# Patient Record
Sex: FEMALE | Race: WHITE | NOT HISPANIC OR LATINO
[De-identification: names, ages, dates, MRNs, and addresses within clinical notes are randomized per-mention and may not be internally consistent; named-entity substitution may affect disease eponyms.]

---

## 2019-03-21 ENCOUNTER — APPOINTMENT (OUTPATIENT)
Dept: HEART AND VASCULAR | Facility: CLINIC | Age: 69
End: 2019-03-21
Payer: MEDICARE

## 2019-03-21 ENCOUNTER — NON-APPOINTMENT (OUTPATIENT)
Age: 69
End: 2019-03-21

## 2019-03-21 ENCOUNTER — LABORATORY RESULT (OUTPATIENT)
Age: 69
End: 2019-03-21

## 2019-03-21 VITALS
BODY MASS INDEX: 20.57 KG/M2 | HEART RATE: 72 BPM | WEIGHT: 128 LBS | DIASTOLIC BLOOD PRESSURE: 82 MMHG | SYSTOLIC BLOOD PRESSURE: 118 MMHG | HEIGHT: 66 IN

## 2019-03-21 DIAGNOSIS — Z86.69 PERSONAL HISTORY OF OTHER DISEASES OF THE NERVOUS SYSTEM AND SENSE ORGANS: ICD-10-CM

## 2019-03-21 DIAGNOSIS — M81.0 AGE-RELATED OSTEOPOROSIS W/OUT CURRENT PATHOLOGICAL FRACTURE: ICD-10-CM

## 2019-03-21 DIAGNOSIS — D47.2 MONOCLONAL GAMMOPATHY: ICD-10-CM

## 2019-03-21 DIAGNOSIS — Z87.891 PERSONAL HISTORY OF NICOTINE DEPENDENCE: ICD-10-CM

## 2019-03-21 DIAGNOSIS — Z87.898 PERSONAL HISTORY OF OTHER SPECIFIED CONDITIONS: ICD-10-CM

## 2019-03-21 DIAGNOSIS — Z82.3 FAMILY HISTORY OF STROKE: ICD-10-CM

## 2019-03-21 PROCEDURE — 36415 COLL VENOUS BLD VENIPUNCTURE: CPT

## 2019-03-21 PROCEDURE — 99215 OFFICE O/P EST HI 40 MIN: CPT

## 2019-03-21 PROCEDURE — 93000 ELECTROCARDIOGRAM COMPLETE: CPT

## 2019-03-21 RX ORDER — MINOXIDIL 2 %
5 SOLUTION, NON-ORAL TOPICAL
Refills: 0 | Status: ACTIVE | COMMUNITY
Start: 2019-03-21

## 2019-03-21 RX ORDER — TRETINOIN 0.25 MG/G
0.03 CREAM TOPICAL
Refills: 0 | Status: ACTIVE | COMMUNITY
Start: 2019-03-21

## 2019-03-21 NOTE — PHYSICAL EXAM
[General Appearance - Well Developed] : well developed [Normal Appearance] : normal appearance [Well Groomed] : well groomed [General Appearance - Well Nourished] : well nourished [No Deformities] : no deformities [General Appearance - In No Acute Distress] : no acute distress [Normal Conjunctiva] : the conjunctiva exhibited no abnormalities [Eyelids - No Xanthelasma] : the eyelids demonstrated no xanthelasmas [Normal Oral Mucosa] : normal oral mucosa [No Oral Pallor] : no oral pallor [No Oral Cyanosis] : no oral cyanosis [Normal Jugular Venous A Waves Present] : normal jugular venous A waves present [Normal Jugular Venous V Waves Present] : normal jugular venous V waves present [No Jugular Venous Yip A Waves] : no jugular venous yip A waves [Heart Rate And Rhythm] : heart rate and rhythm were normal [Heart Sounds] : normal S1 and S2 [Murmurs] : no murmurs present [Respiration, Rhythm And Depth] : normal respiratory rhythm and effort [Exaggerated Use Of Accessory Muscles For Inspiration] : no accessory muscle use [Auscultation Breath Sounds / Voice Sounds] : lungs were clear to auscultation bilaterally [Abdomen Soft] : soft [Abdomen Tenderness] : non-tender [Abdomen Mass (___ Cm)] : no abdominal mass palpated [Abnormal Walk] : normal gait [Gait - Sufficient For Exercise Testing] : the gait was sufficient for exercise testing [Nail Clubbing] : no clubbing of the fingernails [Cyanosis, Localized] : no localized cyanosis [Petechial Hemorrhages (___cm)] : no petechial hemorrhages [] : no ischemic changes [Skin Color & Pigmentation] : normal skin color and pigmentation [Skin Turgor] : normal skin turgor [No Venous Stasis] : no venous stasis [Skin Lesions] : no skin lesions [No Skin Ulcers] : no skin ulcer [No Xanthoma] : no  xanthoma was observed [Oriented To Time, Place, And Person] : oriented to person, place, and time [Affect] : the affect was normal [Mood] : the mood was normal [No Anxiety] : not feeling anxious [FreeTextEntry1] : macular/papular diffuse rash noted on inner surface of b/l forearms and b/l LE

## 2019-03-21 NOTE — REASON FOR VISIT
[FreeTextEntry1] : 69 year old female w/ a PMHx of palpitations, MGUS (IgM), cataracts, osteoporosis.\par \par \par

## 2019-03-21 NOTE — DISCUSSION/SUMMARY
[FreeTextEntry1] : - Will check lipid profile, B12, Vitamin D levels\par - Her mild erythema and blanching (not clearly a rash) is unlikely to be related to a cardiovascular cause and suggest she investigate a possible culprit from her lifestyle - detergents, lotions, food, etc. \par -She will show to Dr. Pemberton as well when she sees him next week. \par -Calcium score to reassess for plaque in guiding decision for statin.

## 2019-03-21 NOTE — ASSESSMENT
[FreeTextEntry1] : 69 year old female w/ a PMHx of palpitations, MGUS (IgM), cataracts, osteoporosis.

## 2019-03-21 NOTE — HISTORY OF PRESENT ILLNESS
[FreeTextEntry1] : 69 year old female w/ a PMHx of palpitations, MGUS (IgM), cataracts, osteoporosis. She denies any cardiac symptoms. She c/o rash for the past year - red spots on inside of arms and lower legs. She denies any discomfort associated with it - no pain, itching, etc. She visited a dermatologist who was unable to diagnosis it and told her she should see a cardiologist to r/o vascular cause. \par \par Lifestyle History:\par Mediterranean Diet Score (9 question survey) was 8. She has been a vegan for the past 2 years - occasionally will have fish \par (8-9: optimal, 6-7: near-optimal, 4-5: suboptimal, 0-3: markedly suboptimal)\par Exercise: Patient reports exercising at a moderate level for >150 minutes per week. Reports going to the gym at least 5 days per week. \par Smoking: Former smoker (1/2-1 PPD x 5 years; quit 48 years ago).\par Stress: Patient denies any stress. \par \par She denies any chest pain, palpitations, SOB, LE edema, orthopnea, or PND.

## 2019-03-22 LAB
25(OH)D3 SERPL-MCNC: 28 NG/ML
VIT B12 SERPL-MCNC: >2000 PG/ML

## 2019-04-03 ENCOUNTER — FORM ENCOUNTER (OUTPATIENT)
Age: 69
End: 2019-04-03

## 2019-04-04 ENCOUNTER — OUTPATIENT (OUTPATIENT)
Dept: OUTPATIENT SERVICES | Facility: HOSPITAL | Age: 69
LOS: 1 days | End: 2019-04-04

## 2019-04-04 ENCOUNTER — APPOINTMENT (OUTPATIENT)
Dept: CT IMAGING | Facility: CLINIC | Age: 69
End: 2019-04-04

## 2019-05-15 ENCOUNTER — RECORD ABSTRACTING (OUTPATIENT)
Age: 69
End: 2019-05-15

## 2020-01-02 ENCOUNTER — RX RENEWAL (OUTPATIENT)
Age: 70
End: 2020-01-02

## 2020-06-04 ENCOUNTER — APPOINTMENT (OUTPATIENT)
Dept: HEART AND VASCULAR | Facility: CLINIC | Age: 70
End: 2020-06-04
Payer: MEDICARE

## 2020-06-04 PROCEDURE — 99214 OFFICE O/P EST MOD 30 MIN: CPT | Mod: 95

## 2020-06-04 NOTE — DISCUSSION/SUMMARY
[___ Month(s)] : [unfilled] month(s) [FreeTextEntry1] : Patient is a 71yo F w/ a PMHx of HLD, palpitations, MGUS (IgM), cataracts, and osteoporosis who calls for follow up. \par \par She will try crestor 5 mg daily and will repeat lipids in 2-3 months. She will notify us of any symptoms in the meantime. \par She will continue to work on her lifestyle with all the stressors and attempt to eat healthy. Encouraged to get some of that resistance type of exercise. \par \par

## 2020-06-04 NOTE — REASON FOR VISIT
Puneet Root [FreeTextEntry1] : Patient is a 69yo F w/ a PMHx of HLD, palpitations, MGUS (IgM), cataracts, and osteoporosis who calls for follow up.

## 2020-06-04 NOTE — HISTORY OF PRESENT ILLNESS
[Home] : at home, [unfilled] , at the time of the visit. [Verbal consent obtained from patient] : the patient, [unfilled] [Medical Office: ([facility])___] : at the medical office located in  [FreeTextEntry1] : This visit was conducted using a telehealth platform in the setting of COVID-19 Pandemic.\par Patient initiated current encounter. Patient has provided verbal authorization to participate in this visit. \par Reason for telehealth visit: follow-up of CVD\par I have spent 25 minutes with the patient discussing.\par Documentation- 5 minutes\par Physical exam was not done, however patient provided blood pressures and weight.\par \par \par Patient is a 71yo F w/ a PMHx of HLD, palpitations, MGUS (IgM), cataracts, and osteoporosis who calls for follow up. \par \par Had CCS in 2013 and 2019 and both were zero.\par \par She is walking extensively nowadays, but not doing the prior resistance type exercises that she used to do at the gym. \par She denies any symptoms at this time.\par She is otherwise currently eating a lot of eggs and salmon for her hair loss and has a hard time spending a lot of time on meals because of little time and a lot of stress at home.\par Her daughter is getting a divorce and has a hyperactive 11 month old now living with them.  \par Her main concern with statins is the possibility of developing Parkinsons and she would like a hydrophylic statin for that reason based on some literature suggesting less likelihood to develop. \par

## 2021-03-05 ENCOUNTER — APPOINTMENT (OUTPATIENT)
Dept: HEART AND VASCULAR | Facility: CLINIC | Age: 71
End: 2021-03-05
Payer: MEDICARE

## 2021-03-05 PROCEDURE — 99213 OFFICE O/P EST LOW 20 MIN: CPT | Mod: 95

## 2021-03-05 RX ORDER — ATORVASTATIN CALCIUM 10 MG/1
10 TABLET, FILM COATED ORAL
Qty: 90 | Refills: 3 | Status: DISCONTINUED | COMMUNITY
Start: 2019-12-30 | End: 2021-03-05

## 2021-03-08 NOTE — DISCUSSION/SUMMARY
[FreeTextEntry1] : Patient is a 72 yo F w/ a PMHx of HLD, palpitations, MGUS (IgM), cataracts, and osteoporosis who calls for follow up. \par \par She will have her lipids repeated in a month and would consider increasing her crestor at this time as she understands the importance of getting her LDL as low as possible. \par She will also monitor her BP's but normal to date. \par Encouraged to get exercise and healthy diet as feasible. \par She will return for follow-up in one month when she has had her cast removed.

## 2021-03-08 NOTE — HISTORY OF PRESENT ILLNESS
[Home] : at home, [unfilled] , at the time of the visit. [Medical Office: ([facility])___] : at the medical office located in  [Verbal consent obtained from patient] : the patient, [unfilled] [FreeTextEntry1] : This visit was conducted using a telehealth platform in the setting of COVID-19 Pandemic.\par Patient initiated current encounter. Patient has provided verbal authorization to participate in this visit. \par Reason for telehealth visit: follow-up of CVD\par I have spent 20 minutes with the patient discussing.\par Documentation- 5 minutes\par Physical exam was not done, however patient provided blood pressures and weight.\par \par Patient is a 70 yo F w/ a PMHx of HLD, palpitations, MGUS (IgM), cataracts, and osteoporosis who calls for follow up. \par \par Had CCS in 2013 and 2019 and both were zero. \par \par She had a recent fall a month ago where she slipped, but then hit her head and passed out with that episode. She reports being unconscious for some time with that episode even though she is not sure. She reports that in the work up at the hospital, the EKG noted atrial enlargement and her brain MRI noted vascular changes both of which concerned her. \par \par She had only been on statin in the past several years at a low dose but is now wondering if she needs a higher dose. \par \par She reports that she has a cast for a fracture and is hoping to return when she no longer has the fracture and is able to drive. \par \par

## 2021-04-12 ENCOUNTER — APPOINTMENT (OUTPATIENT)
Dept: HEART AND VASCULAR | Facility: CLINIC | Age: 71
End: 2021-04-12

## 2021-06-06 ENCOUNTER — RESULT CHARGE (OUTPATIENT)
Age: 71
End: 2021-06-06

## 2021-06-07 ENCOUNTER — APPOINTMENT (OUTPATIENT)
Dept: HEART AND VASCULAR | Facility: CLINIC | Age: 71
End: 2021-06-07
Payer: MEDICARE

## 2021-06-07 ENCOUNTER — NON-APPOINTMENT (OUTPATIENT)
Age: 71
End: 2021-06-07

## 2021-06-07 VITALS
HEART RATE: 80 BPM | DIASTOLIC BLOOD PRESSURE: 78 MMHG | BODY MASS INDEX: 20.73 KG/M2 | WEIGHT: 129 LBS | SYSTOLIC BLOOD PRESSURE: 112 MMHG | HEIGHT: 66 IN | TEMPERATURE: 98 F

## 2021-06-07 PROCEDURE — 93000 ELECTROCARDIOGRAM COMPLETE: CPT

## 2021-06-07 PROCEDURE — 93306 TTE W/DOPPLER COMPLETE: CPT

## 2021-06-07 PROCEDURE — 99214 OFFICE O/P EST MOD 30 MIN: CPT

## 2021-06-07 NOTE — REVIEW OF SYSTEMS
[Fever] : no fever [Headache] : no headache [Chills] : no chills [Feeling Fatigued] : not feeling fatigued [Blurry Vision] : no blurred vision [Seeing Double (Diplopia)] : no diplopia [Earache] : no earache [Discharge From Ears] : no discharge from the ears [Hearing Loss] : no hearing loss [Sore Throat] : no sore throat [Sinus Pressure] : no sinus pressure [Tinnitus] : no tinnitus [SOB] : no shortness of breath [Dyspnea on exertion] : not dyspnea during exertion [Chest Discomfort] : no chest discomfort [Lower Ext Edema] : no extremity edema [Leg Claudication] : no intermittent leg claudication [Palpitations] : no palpitations [Orthopnea] : no orthopnea [PND] : no PND [Syncope] : no syncope [Cough] : no cough [Wheezing] : no wheezing [Abdominal Pain] : no abdominal pain [Nausea] : no nausea [Vomiting] : no vomiting [Change in Appetite] : no change in appetite [Change In The Stool] : no change in stool [Dysphagia] : no dysphagia [Constipation] : no constipation [Blood in Stool] : no blood in stool [Dysuria] : no dysuria [Pelvic Pain] : no pelvic pain [Joint Pain] : no joint pain [Joint Swelling] : no joint swelling [Myalgia] : no myalgia [Rash] : no rash [Itching] : no itching [Skin Lesions] : no skin lesions [Telangiectasias] : no telangiectasias [Dizziness] : no dizziness [Tremor] : no tremor was seen [Convulsions] : no convulsions [Tingling (Paresthesia)] : no tingling [Limb Weakness (Paresis)] : no limb weakness (Paresis) [Confusion] : no confusion was observed [Memory Lapses Or Loss] : no memory lapses or loss [Under Stress] : not under stress [Suicidal] : not suicidal [Easy Bleeding] : no tendency for easy bleeding

## 2021-06-07 NOTE — PHYSICAL EXAM
[Well Developed] : well developed [Well Nourished] : well nourished [No Acute Distress] : no acute distress [Normal Conjunctiva] : normal conjunctiva [Normal Venous Pressure] : normal venous pressure [No Carotid Bruit] : no carotid bruit [Normal S1, S2] : normal S1, S2 [No Murmur] : no murmur [No Rub] : no rub [Clear Lung Fields] : clear lung fields [Good Air Entry] : good air entry [No Respiratory Distress] : no respiratory distress  [Soft] : abdomen soft [Non Tender] : non-tender [No Masses/organomegaly] : no masses/organomegaly [Normal Gait] : normal gait [No Edema] : no edema [No Cyanosis] : no cyanosis [No Clubbing] : no clubbing [No Rash] : no rash [No Skin Lesions] : no skin lesions [Moves all extremities] : moves all extremities [No Focal Deficits] : no focal deficits [Normal Speech] : normal speech [Alert and Oriented] : alert and oriented [Normal memory] : normal memory

## 2021-06-07 NOTE — HISTORY OF PRESENT ILLNESS
[FreeTextEntry1] : Patient is a 70 yo F w/ a PMHx of HLD, palpitations, MGUS (IgM), cataracts, and osteoporosis who calls for follow up. \par \par Had CCS in 2013 and 2019 and both were zero. \par \par She had a fall in the winter when slipping on black ice. She definitely does not feel like she had syncope precipitating the fall. She reports that in the work up at the hospital, the EKG noted atrial enlargement and her brain MRI noted vascular changes both of which concerned her. \par \par She has been feeling well overall and was walking up to 6 miles at a time. She reports that she is more concerned about her 's health and his mental status lately. \par \par She wishes to repeat her lipids and will do so when fasting. \par \par She is eating a mostly plant-based diet and maintaining a healthy weight. \par \par

## 2021-06-07 NOTE — ASSESSMENT
[FreeTextEntry1] : Patient is a 70 yo F w/ a PMHx of HLD, palpitations, MGUS (IgM), cataracts, and osteoporosis who calls for follow up. \par \par Will repeat labs outside of the office when she is fasting. \par \par Echo done today with no significant pathology. \par \par She will continue to work on maintaining a healthy lifestyle as she is and will send her lipid profile for our review. \par \par I feel confident that her episode in the winter was a mechanical fall and does not any further work-up. \par \par Return in 6 months or visit via telehealth. \par \par ~30 minutes spend counseling patient about her own health and managing that of her .

## 2021-08-20 ENCOUNTER — NON-APPOINTMENT (OUTPATIENT)
Age: 71
End: 2021-08-20

## 2021-09-29 ENCOUNTER — TRANSCRIPTION ENCOUNTER (OUTPATIENT)
Age: 71
End: 2021-09-29

## 2021-09-30 ENCOUNTER — TRANSCRIPTION ENCOUNTER (OUTPATIENT)
Age: 71
End: 2021-09-30

## 2021-10-01 ENCOUNTER — TRANSCRIPTION ENCOUNTER (OUTPATIENT)
Age: 71
End: 2021-10-01

## 2021-10-04 ENCOUNTER — TRANSCRIPTION ENCOUNTER (OUTPATIENT)
Age: 71
End: 2021-10-04

## 2021-10-05 ENCOUNTER — TRANSCRIPTION ENCOUNTER (OUTPATIENT)
Age: 71
End: 2021-10-05

## 2021-11-02 ENCOUNTER — APPOINTMENT (OUTPATIENT)
Dept: HEART AND VASCULAR | Facility: CLINIC | Age: 71
End: 2021-11-02
Payer: MEDICARE

## 2021-11-02 PROCEDURE — 99214 OFFICE O/P EST MOD 30 MIN: CPT | Mod: 95

## 2021-11-03 ENCOUNTER — NON-APPOINTMENT (OUTPATIENT)
Age: 71
End: 2021-11-03

## 2021-11-03 ENCOUNTER — APPOINTMENT (OUTPATIENT)
Dept: PULMONOLOGY | Facility: CLINIC | Age: 71
End: 2021-11-03
Payer: MEDICARE

## 2021-11-03 VITALS
HEIGHT: 66 IN | DIASTOLIC BLOOD PRESSURE: 80 MMHG | TEMPERATURE: 97.8 F | OXYGEN SATURATION: 98 % | HEART RATE: 80 BPM | WEIGHT: 129 LBS | BODY MASS INDEX: 20.73 KG/M2 | SYSTOLIC BLOOD PRESSURE: 120 MMHG

## 2021-11-03 PROCEDURE — 99204 OFFICE O/P NEW MOD 45 MIN: CPT | Mod: GC

## 2021-11-03 NOTE — ASSESSMENT
[FreeTextEntry1] : Data reviewed:\par \par Cardiac CT [facility] 2019 personally reviewed : multiple tiny nodules up to 4mm\par \par Impression:\par Lung Nodules\par \par Plan:\par Multiple small nodules <6mm with low risk for lung cancer. Per Fleischner Society guidelines there is no need for further monitoring or repeat CT scans. Mrs. Mcclellan will follow up as needed. \par --\par Attending Addendum\par \par Seen and examined by me and agree w above. Per Fleischner Society guidelines no further follow up is needed in this minimal smoker w tiny nodules. Pt reassured.\par

## 2021-11-03 NOTE — HISTORY OF PRESENT ILLNESS
[TextBox_4] : 11/3/21 [Octaviaer]: Mrs. 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 Guardado for indecently found pulmonary nodules on a Cardiac CT scan from 2019. She has a 5 pk/yr smoking history, quit 50 years ago. No family history of lung ca in first degree relative. Denies any significant cough, SOB, CANDELARIA, chest pain or phlegm.

## 2021-11-04 NOTE — DISCUSSION/SUMMARY
[FreeTextEntry1] : Increase the crestor to 10 mg daily for at least two months and recheck lipids. Will work to get her to goal slowly based on her tolerance.\par \par Advised her to follow-up with rheumatology for symptoms. \par \par Encouraged her to continue her healthy diet and attempt to get more activity.

## 2021-11-04 NOTE — HISTORY OF PRESENT ILLNESS
[FreeTextEntry1] : This visit was conducted using a telehealth platform in the setting of COVID-19 Pandemic.\par Patient initiated current encounter. Patient has provided verbal authorization to participate in this visit. \par Reason for telehealth visit: hyperlipidemia\par I have spent 30  minutes with the patient face-to-face discussing.\par Documentation- 5 minutes\par Physical exam was not done, however patient provided blood pressures and weight. \par \par Patient is a 70 yo F w/ a PMHx of HLD, palpitations, MGUS (IgM), cataracts, and osteoporosis who calls for follow up. \par \par Had CCS in 2013 and 2019 and both were zero. \par \par Livido Reticularis - seeing rheumatology for that and Raynaud's. They are suggesting a much more extensive work-up for rheumatologic disorders.  \par \par She had left sided breast surgery and that is where the nodules were noted on her CT coronary test. \par \par She is seeing a pulmonologist for evaluation of pulmonary nodules.  \par \par She continues to eat a mostly plant-based diet and maintaining a healthy weight. \par \par She also has Dupetreyne's contractures. \par \par 9/22/2021- total-226, , HDL 82, trig 61\par \par She had had a very bad rash on her chest/breast which has now resolved, but she does not have an etiology.

## 2022-01-10 ENCOUNTER — TRANSCRIPTION ENCOUNTER (OUTPATIENT)
Age: 72
End: 2022-01-10

## 2022-02-25 ENCOUNTER — TRANSCRIPTION ENCOUNTER (OUTPATIENT)
Age: 72
End: 2022-02-25

## 2022-02-28 ENCOUNTER — TRANSCRIPTION ENCOUNTER (OUTPATIENT)
Age: 72
End: 2022-02-28

## 2022-03-01 ENCOUNTER — TRANSCRIPTION ENCOUNTER (OUTPATIENT)
Age: 72
End: 2022-03-01

## 2022-03-07 ENCOUNTER — APPOINTMENT (OUTPATIENT)
Dept: HEART AND VASCULAR | Facility: CLINIC | Age: 72
End: 2022-03-07
Payer: MEDICARE

## 2022-03-07 PROCEDURE — 99214 OFFICE O/P EST MOD 30 MIN: CPT | Mod: 95

## 2022-03-08 NOTE — HISTORY OF PRESENT ILLNESS
[FreeTextEntry1] : Patient is a 73 yo F w/ a PMHx of HLD, palpitations, MGUS (IgM), cataracts, and osteoporosis who calls for follow up. \par \par She had a rheumatologic visit that involved many lab tests. She has not been taking the zetia and is taking crestor aloe. She is worried about the potential side effects of it. \par \par She has now gone from osteoporosis to osteopenia. \par \par Her blood pressures are mostly checked at the offices have been normal. She has not been checking her BP's at home. \par \par She is walking and doing pilates at home. \par \par 9/23/2022- trig- 62, HDL 82,chol 226, \par \par Had CCS in 2013 and 2019 and both were zero. \par \par She is eating a mostly plant-based diet and maintaining a healthy weight. \par \par

## 2022-03-08 NOTE — DISCUSSION/SUMMARY
[FreeTextEntry1] : Patient is a 71 yo F w/ a PMHx of HLD, palpitations, MGUS (IgM), cataracts, and osteoporosis who calls for follow up. \par \par Based on the fact that she has tolerated crestor without issues, she is willing to attempt the increased dose and see if it is of benefit. \par \par Recheck her lipids in 2-3 months. \par \par She will then return one week after that for in person visit which she will keep as at least once yearly wiith remaining to be done remotely. \par \par Encouraged her to continue her healthy lifestyle habits.

## 2022-03-09 ENCOUNTER — TRANSCRIPTION ENCOUNTER (OUTPATIENT)
Age: 72
End: 2022-03-09

## 2022-06-06 ENCOUNTER — APPOINTMENT (OUTPATIENT)
Dept: HEART AND VASCULAR | Facility: CLINIC | Age: 72
End: 2022-06-06
Payer: MEDICARE

## 2022-06-06 VITALS
OXYGEN SATURATION: 98 % | HEART RATE: 74 BPM | TEMPERATURE: 97.2 F | DIASTOLIC BLOOD PRESSURE: 80 MMHG | SYSTOLIC BLOOD PRESSURE: 125 MMHG | WEIGHT: 129 LBS | BODY MASS INDEX: 19.11 KG/M2 | HEIGHT: 69 IN

## 2022-06-06 PROCEDURE — 99214 OFFICE O/P EST MOD 30 MIN: CPT

## 2022-06-06 PROCEDURE — 93000 ELECTROCARDIOGRAM COMPLETE: CPT

## 2022-06-06 RX ORDER — ROSUVASTATIN CALCIUM 10 MG/1
10 TABLET, FILM COATED ORAL DAILY
Qty: 90 | Refills: 3 | Status: DISCONTINUED | COMMUNITY
Start: 2020-06-04 | End: 2022-06-06

## 2022-06-06 NOTE — HISTORY OF PRESENT ILLNESS
[FreeTextEntry1] : Patient is a 73 yo F w/ a PMHx of HLD, palpitations, MGUS (IgM), cataracts, and osteoporosis who calls for follow up. \par \par Since last visit: \par No new symptoms. No issues. \par She developed dizziness and feeling unwell after 1 dose of the statin 10 mg. We had discussed rechallenging or taking the medication at night however she was worried about side effects so she has not done that yet. \par We also talked about low LDL and their relation to brain function as well as statin and dementia. \par She is willing to alternate between 5 and 10 mg for now and see how she does \par \par Her blood pressures are mostly checked at the offices have been normal. She has not been checking her BP's at home. \par \par She is walking and doing pilates at home. \par \par 9/23/2022- trig- 62, HDL 82,chol 226, \par Lpa 74.9\par \par Had CCS in 2013 and 2019 and both were zero. \par \par She is eating a mostly plant-based diet and maintaining a healthy weight. \par

## 2022-06-06 NOTE — DISCUSSION/SUMMARY
[FreeTextEntry1] : Patient is a 71 yo F w/ a PMHx of HLD, palpitations, MGUS (IgM), cataracts, and osteoporosis who calls for follow up. \par \par # DLD - Uncontrolled \par - We agreed to alternate between 5 and 10 for now and if she tolerates we can stay on the 10 then repeat lipids \par \par Encouraged her to continue her healthy lifestyle habits.

## 2022-06-06 NOTE — PHYSICAL EXAM
[Well Developed] : well developed [Well Nourished] : well nourished [No Acute Distress] : no acute distress [Normal Venous Pressure] : normal venous pressure [No Carotid Bruit] : no carotid bruit [Normal S1, S2] : normal S1, S2 [No Murmur] : no murmur [Clear Lung Fields] : clear lung fields [Good Air Entry] : good air entry [Soft] : abdomen soft [Non Tender] : non-tender [No Masses/organomegaly] : no masses/organomegaly [Normal Gait] : normal gait [No Edema] : no edema [No Cyanosis] : no cyanosis [No Rash] : no rash [No Skin Lesions] : no skin lesions [Moves all extremities] : moves all extremities [No Focal Deficits] : no focal deficits [Normal Speech] : normal speech [Alert and Oriented] : alert and oriented [Normal memory] : normal memory

## 2022-09-21 ENCOUNTER — TRANSCRIPTION ENCOUNTER (OUTPATIENT)
Age: 72
End: 2022-09-21

## 2023-06-27 ENCOUNTER — NON-APPOINTMENT (OUTPATIENT)
Age: 73
End: 2023-06-27

## 2023-06-27 ENCOUNTER — APPOINTMENT (OUTPATIENT)
Dept: HEART AND VASCULAR | Facility: CLINIC | Age: 73
End: 2023-06-27
Payer: MEDICARE

## 2023-06-27 VITALS
BODY MASS INDEX: 19.11 KG/M2 | HEIGHT: 69 IN | SYSTOLIC BLOOD PRESSURE: 142 MMHG | HEART RATE: 81 BPM | DIASTOLIC BLOOD PRESSURE: 83 MMHG | TEMPERATURE: 97.6 F | WEIGHT: 129 LBS | OXYGEN SATURATION: 98 %

## 2023-06-27 VITALS — SYSTOLIC BLOOD PRESSURE: 115 MMHG | DIASTOLIC BLOOD PRESSURE: 77 MMHG

## 2023-06-27 PROCEDURE — 36415 COLL VENOUS BLD VENIPUNCTURE: CPT

## 2023-06-27 PROCEDURE — 99215 OFFICE O/P EST HI 40 MIN: CPT

## 2023-06-27 PROCEDURE — 93000 ELECTROCARDIOGRAM COMPLETE: CPT

## 2023-06-27 RX ORDER — EZETIMIBE 10 MG/1
10 TABLET ORAL DAILY
Qty: 90 | Refills: 3 | Status: COMPLETED | COMMUNITY
Start: 2021-09-28 | End: 2023-06-27

## 2023-06-27 NOTE — DISCUSSION/SUMMARY
[FreeTextEntry1] : Patient is a 72 yo F w/ a PMHx of HLD, palpitations, MGUS (IgM), cataracts, and osteoporosis who returns for follow up. \par \par # DLD - \par - crestor 10mg qd\par - follow up lipid panel obtained today to assess response on crestor 10mg. Will consider escalating as needed. \par \par Mildly elevated glucose-\par Repeat A1C for improvement. \par \par Encouraged her to continue her healthy lifestyle habits. \par \par Return in one year for follow-up and echo.  [EKG obtained to assist in diagnosis and management of assessed problem(s)] : EKG obtained to assist in diagnosis and management of assessed problem(s)

## 2023-06-27 NOTE — HISTORY OF PRESENT ILLNESS
[FreeTextEntry1] : Patient is a 72 yo F w/ a PMHx of HLD, palpitations, MGUS (IgM), cataracts, and osteoporosis who returns for follow up. \par \par She has been compliant with crestor 10mg qd, concerned that it is making her tired. She also reports that she doesn't sleep well and is planned for sleep study to further evaluate.\par \par She is walking and doing cardio pilates and pilates at home at least 5 days per week.\par \par 9/23/2022- trig- 62, HDL 82,chol 226, \par Lpa 74.9\par \par Had CCS in 2013 and 2019 and both were zero. \par \par She is eating a mostly plant-based diet and maintaining a healthy weight. \par

## 2023-06-28 LAB
CHOLEST SERPL-MCNC: 186 MG/DL
ESTIMATED AVERAGE GLUCOSE: 114 MG/DL
HBA1C MFR BLD HPLC: 5.6 %
HDLC SERPL-MCNC: 76 MG/DL
LDLC SERPL CALC-MCNC: 92 MG/DL
NONHDLC SERPL-MCNC: 110 MG/DL
TRIGL SERPL-MCNC: 92 MG/DL

## 2023-06-30 ENCOUNTER — TRANSCRIPTION ENCOUNTER (OUTPATIENT)
Age: 73
End: 2023-06-30

## 2023-12-14 ENCOUNTER — TRANSCRIPTION ENCOUNTER (OUTPATIENT)
Age: 73
End: 2023-12-14

## 2023-12-15 ENCOUNTER — TRANSCRIPTION ENCOUNTER (OUTPATIENT)
Age: 73
End: 2023-12-15

## 2023-12-15 DIAGNOSIS — R73.03 PREDIABETES.: ICD-10-CM

## 2024-01-24 ENCOUNTER — TRANSCRIPTION ENCOUNTER (OUTPATIENT)
Age: 74
End: 2024-01-24

## 2024-02-12 ENCOUNTER — APPOINTMENT (OUTPATIENT)
Dept: HEART AND VASCULAR | Facility: CLINIC | Age: 74
End: 2024-02-12
Payer: MEDICARE

## 2024-02-12 VITALS
BODY MASS INDEX: 21.49 KG/M2 | DIASTOLIC BLOOD PRESSURE: 84 MMHG | WEIGHT: 129 LBS | SYSTOLIC BLOOD PRESSURE: 133 MMHG | HEART RATE: 64 BPM | HEIGHT: 65 IN

## 2024-02-12 VITALS — SYSTOLIC BLOOD PRESSURE: 125 MMHG | DIASTOLIC BLOOD PRESSURE: 80 MMHG

## 2024-02-12 DIAGNOSIS — E78.5 HYPERLIPIDEMIA, UNSPECIFIED: ICD-10-CM

## 2024-02-12 DIAGNOSIS — Z00.00 ENCOUNTER FOR GENERAL ADULT MEDICAL EXAMINATION W/OUT ABNORMAL FINDINGS: ICD-10-CM

## 2024-02-12 PROCEDURE — G2211 COMPLEX E/M VISIT ADD ON: CPT

## 2024-02-12 PROCEDURE — 99214 OFFICE O/P EST MOD 30 MIN: CPT

## 2024-02-12 PROCEDURE — 93000 ELECTROCARDIOGRAM COMPLETE: CPT

## 2024-02-12 RX ORDER — RIBOFLAVIN (VITAMIN B2) 50 MG
TABLET ORAL
Refills: 0 | Status: DISCONTINUED | COMMUNITY
End: 2024-02-12

## 2024-02-12 NOTE — PHYSICAL EXAM
[Well Developed] : well developed [Well Nourished] : well nourished [No Acute Distress] : no acute distress [Normal Venous Pressure] : normal venous pressure [Normal S1, S2] : normal S1, S2 [No Murmur] : no murmur [Clear Lung Fields] : clear lung fields [Good Air Entry] : good air entry [Soft] : abdomen soft [Non Tender] : non-tender [Normal Gait] : normal gait [No Edema] : no edema [No Cyanosis] : no cyanosis [No Rash] : no rash [Moves all extremities] : moves all extremities [Alert and Oriented] : alert and oriented

## 2024-02-12 NOTE — HISTORY OF PRESENT ILLNESS
[FreeTextEntry1] : Patient is a 75 yo F w/ a PMHx of HLD, palpitations, MGUS (IgM), cataracts, and osteopenia (previously osteoporotic) who returns for follow up and echo.   She has been adherent to Crestor 10mg qd. She is walking, doing Cardio workouts and Pilates at home at least 5 days per week. Patient states having a healthy diet with plenty of vegetables daily and salmon. Patient does not have meat in diet. No chest pain, SOB, palpitations, nausea, vomiting, PND, or orthopnea reported.  Patient currently does not have a PCP at this time.   She is considering breast reduction surgery since the weight of her breasts is causing her quite a bit of neck and back discomfort.  ------------------------------------------------------------------------------------------------------------------------------------------------------------ Labs:  - 6/28/23: A1C 5.6%  - 6/28/2023: TAG 92, , HDL 76, LDL 92 - 9/23/2022- trig- 62, HDL 82, Chol 226,  - Lpa 74.9  Imaging and testing: - Echo (6/2021): nl mitral valve, no mitral regurgitation, normal trileaflet aortic valve, no AR seen, nl LV internal dimensions and wall thickness, nl LV systolic funx with EF 70%, no segmental wall motion abnormalities, nl RV sz and fnx, trivial pericardial effusion noted  - Had CCS in 2013 and 2019 and both were zero

## 2024-02-12 NOTE — DISCUSSION/SUMMARY
[FreeTextEntry1] : Patient is a 73 yo F w/ a PMHx of HLD, palpitations, MGUS (IgM), cataracts, and osteopenia (previously osteoporotic) who returns for follow up and echo.   #HLD #primary prevention #ASCVD risk optimization   - 6/28/23: A1C 5.6%  - 6/28/2023: TAG 92, , HDL 76, LDL 92 - c/w Crestor 10mg qd, LDL goal <100  - follow up lipid panel obtained today and further treatment based on the lipid panel - lifestyle modifications as below  - BP and HBA1C well controlled  - F/u echo done today   #Lifestyle - Encouraged patient to continue healthy exercise and eating habits, focusing on a Mediterranean style of eating and aiming for the recommended 150 minutes per week of moderate physical activity.  Ms. Mcclellan is stable to proceed to breast reduction surgery without further work-up. She has no active symptoms of ischemia or heart failure and can continue on her current regimen.   Return in one year for follow-up with Dr Guardado.  Also advised patient to get a PCP for further health maintenance.  [EKG obtained to assist in diagnosis and management of assessed problem(s)] : EKG obtained to assist in diagnosis and management of assessed problem(s)

## 2024-02-13 ENCOUNTER — TRANSCRIPTION ENCOUNTER (OUTPATIENT)
Age: 74
End: 2024-02-13

## 2024-02-13 LAB
CHOLEST SERPL-MCNC: 178 MG/DL
HDLC SERPL-MCNC: 78 MG/DL
LDLC SERPL CALC-MCNC: 86 MG/DL
NONHDLC SERPL-MCNC: 100 MG/DL
TRIGL SERPL-MCNC: 77 MG/DL

## 2024-03-15 ENCOUNTER — APPOINTMENT (OUTPATIENT)
Dept: HEART AND VASCULAR | Facility: CLINIC | Age: 74
End: 2024-03-15

## 2024-03-25 ENCOUNTER — TRANSCRIPTION ENCOUNTER (OUTPATIENT)
Age: 74
End: 2024-03-25

## 2024-03-26 ENCOUNTER — TRANSCRIPTION ENCOUNTER (OUTPATIENT)
Age: 74
End: 2024-03-26

## 2024-04-29 ENCOUNTER — RX RENEWAL (OUTPATIENT)
Age: 74
End: 2024-04-29

## 2024-04-29 RX ORDER — ROSUVASTATIN CALCIUM 10 MG/1
10 TABLET, FILM COATED ORAL
Qty: 90 | Refills: 3 | Status: ACTIVE | COMMUNITY
Start: 1900-01-01 | End: 1900-01-01

## 2024-04-30 ENCOUNTER — TRANSCRIPTION ENCOUNTER (OUTPATIENT)
Age: 74
End: 2024-04-30

## 2024-06-04 ENCOUNTER — TRANSCRIPTION ENCOUNTER (OUTPATIENT)
Age: 74
End: 2024-06-04

## 2024-06-07 ENCOUNTER — APPOINTMENT (OUTPATIENT)
Dept: PULMONOLOGY | Facility: CLINIC | Age: 74
End: 2024-06-07
Payer: MEDICARE

## 2024-06-07 VITALS
HEART RATE: 82 BPM | TEMPERATURE: 97.7 F | HEIGHT: 65 IN | WEIGHT: 129 LBS | SYSTOLIC BLOOD PRESSURE: 130 MMHG | OXYGEN SATURATION: 98 % | BODY MASS INDEX: 21.49 KG/M2 | DIASTOLIC BLOOD PRESSURE: 80 MMHG

## 2024-06-07 DIAGNOSIS — R91.1 SOLITARY PULMONARY NODULE: ICD-10-CM

## 2024-06-07 PROCEDURE — 99213 OFFICE O/P EST LOW 20 MIN: CPT

## 2024-06-07 NOTE — ASSESSMENT
[FreeTextEntry1] : Data reviewed:  Cardiac CT [facility] 2019 personally reviewed : multiple tiny nodules up to 4mm CT chest [place] 6/3/2024 personally reviewed : 10mm ground glass lesion RLL, out of field of view on previous cardiac scan  Impression: 10mm ground glass lesion Former minimal smoker  Plan: Discussed this lesion at length. Given absence of a solid component I would not recommend biopsy and would not recommend thoracic surgery. Would follow Fleischner guidelines in this case, which call for repeat CT in 6-12 mos. We can check in 6 mos here given her level of concern about this lesion. Her local pulm already ordered that for her. She will do it locally and bring images and report here to review.

## 2024-06-07 NOTE — HISTORY OF PRESENT ILLNESS
[Former] : former [< 20 pack-years] : < 20 pack-years [TextBox_4] : 11/3/21 [Jose Alberto]: Mrs. 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 Guardado for recently found pulmonary nodules on a Cardiac CT scan from 2019. She has a 5 pk/yr smoking history, quit 50 years ago. No family history of lung ca in first degree relative. Denies any significant cough, SOB, CANDELARIA, chest pain or phlegm.   6/7/2024: Comes in to review a new CT. Her local pulmonologist who cared for her sleep apnea ordered a new scan just because she'd had some nodules. Some cough from PND. Otherwise no new symptoms or medical problems. [YearQuit] : 1970

## 2024-07-16 ENCOUNTER — TRANSCRIPTION ENCOUNTER (OUTPATIENT)
Age: 74
End: 2024-07-16

## 2024-10-02 ENCOUNTER — TRANSCRIPTION ENCOUNTER (OUTPATIENT)
Age: 74
End: 2024-10-02

## 2024-10-04 ENCOUNTER — APPOINTMENT (OUTPATIENT)
Dept: OTOLARYNGOLOGY | Facility: CLINIC | Age: 74
End: 2024-10-04
Payer: MEDICARE

## 2024-10-04 VITALS
HEIGHT: 65 IN | BODY MASS INDEX: 21.66 KG/M2 | HEART RATE: 79 BPM | DIASTOLIC BLOOD PRESSURE: 79 MMHG | SYSTOLIC BLOOD PRESSURE: 124 MMHG | TEMPERATURE: 97.7 F | OXYGEN SATURATION: 97 % | WEIGHT: 130 LBS

## 2024-10-04 DIAGNOSIS — Z78.9 OTHER SPECIFIED HEALTH STATUS: ICD-10-CM

## 2024-10-04 DIAGNOSIS — Z80.9 FAMILY HISTORY OF MALIGNANT NEOPLASM, UNSPECIFIED: ICD-10-CM

## 2024-10-04 PROCEDURE — 99203 OFFICE O/P NEW LOW 30 MIN: CPT | Mod: 25

## 2024-10-04 PROCEDURE — 31575 DIAGNOSTIC LARYNGOSCOPY: CPT

## 2024-10-04 RX ORDER — ROSUVASTATIN CALCIUM 5 MG/1
TABLET, FILM COATED ORAL
Refills: 0 | Status: ACTIVE | COMMUNITY

## 2024-10-05 NOTE — PHYSICAL EXAM
[Midline] : trachea located in midline position [Normal] : tympanic membranes are normal in both ears [de-identified] : r 5 mm x 5 mm protrusion from superior tonsillar pole behind anterior pillar; small mucous retention cyst noted L tonsil [de-identified] : steady gait

## 2024-10-05 NOTE — ASSESSMENT
[FreeTextEntry1] : 1) tonsil lesion r - may be neoplastic but may also be protrusion of nl tonsil tissue ct rtc with ct explained would not recommend sedation, lying prone and bx without intubation- if supine and bleeds with sedation may aspirate; hover appears reasonable sitting up in office local anesthesia without sedation discussed risks benefits and alts to bx of r tonsillar lesion in office she wished to proceed but not at this visit rtc with ct - unless contraindicated will bx at that point

## 2024-10-05 NOTE — HISTORY OF PRESENT ILLNESS
[de-identified] : 75 y/o F p/w possible tonsil stone on 9/25/24. Patient was seen by PCP was referred to outside ENT.. She said they noted a stone vs cyst in r superior tonsil pole. She saw hematologist on 9/23 hematologist did not note anything in throat. She states that several weeks prior she noted a feeling of "popcorn being stuck in my throat". She states that she does not see the stone anymore, but still feels discomfort in R tonsil area. She has surgery scheduled for removal of lesion in operating room under sedation without intubation. She is here for second opinion. She is a former smoker, quit 50 years ago.No fh relevant to cc. Denies odynophagia otalgia aspiration or hoarseness. She reports h/o recurrent strep infection during childhood. She had a CT chest performed with Dr. Martin, CT chest was significant for ground glass opacity - currently being monitored.  She denies sore throat. She denies a/c use.

## 2024-10-05 NOTE — PHYSICAL EXAM
[Midline] : trachea located in midline position [Normal] : tympanic membranes are normal in both ears [de-identified] : r 5 mm x 5 mm protrusion from superior tonsillar pole behind anterior pillar; small mucous retention cyst noted L tonsil [de-identified] : steady gait

## 2024-10-05 NOTE — PROCEDURE
[Topical Lidocaine] : topical lidocaine [Oxymetazoline HCl] : oxymetazoline HCl [Flexible Endoscope] : examined with the flexible endoscope [Interarytenoid Edema] : interarytenoid area edematous [Dysphagia] : dysphagia not clearly evaluated by indirect laryngoscopy [Serial Number: ___] : Serial Number: [unfilled] [Normal] : posterior cricoid area had healthy pink mucosa in the interarytenoid area and the esophageal inlet [de-identified] : done for dysphagia and oropharyngeal lesion to eval extent findings: mild iah

## 2024-10-05 NOTE — PROCEDURE
[Topical Lidocaine] : topical lidocaine [Oxymetazoline HCl] : oxymetazoline HCl [Flexible Endoscope] : examined with the flexible endoscope [Interarytenoid Edema] : interarytenoid area edematous [Dysphagia] : dysphagia not clearly evaluated by indirect laryngoscopy [Serial Number: ___] : Serial Number: [unfilled] [Normal] : posterior cricoid area had healthy pink mucosa in the interarytenoid area and the esophageal inlet [de-identified] : done for dysphagia and oropharyngeal lesion to eval extent findings: mild iah

## 2024-10-05 NOTE — HISTORY OF PRESENT ILLNESS
[de-identified] : 75 y/o F p/w possible tonsil stone on 9/25/24. Patient was seen by PCP was referred to outside ENT.. She said they noted a stone vs cyst in r superior tonsil pole. She saw hematologist on 9/23 hematologist did not note anything in throat. She states that several weeks prior she noted a feeling of "popcorn being stuck in my throat". She states that she does not see the stone anymore, but still feels discomfort in R tonsil area. She has surgery scheduled for removal of lesion in operating room under sedation without intubation. She is here for second opinion. She is a former smoker, quit 50 years ago.No fh relevant to cc. Denies odynophagia otalgia aspiration or hoarseness. She reports h/o recurrent strep infection during childhood. She had a CT chest performed with Dr. Martin, CT chest was significant for ground glass opacity - currently being monitored.  She denies sore throat. She denies a/c use.

## 2024-10-09 ENCOUNTER — APPOINTMENT (OUTPATIENT)
Dept: OTOLARYNGOLOGY | Facility: CLINIC | Age: 74
End: 2024-10-09
Payer: MEDICARE

## 2024-10-09 ENCOUNTER — NON-APPOINTMENT (OUTPATIENT)
Age: 74
End: 2024-10-09

## 2024-10-09 VITALS
BODY MASS INDEX: 21.66 KG/M2 | HEART RATE: 100 BPM | WEIGHT: 130 LBS | HEIGHT: 65 IN | SYSTOLIC BLOOD PRESSURE: 125 MMHG | OXYGEN SATURATION: 98 % | TEMPERATURE: 98 F | DIASTOLIC BLOOD PRESSURE: 79 MMHG

## 2024-10-09 DIAGNOSIS — R13.14 DYSPHAGIA, PHARYNGOESOPHAGEAL PHASE: ICD-10-CM

## 2024-10-09 PROBLEM — J39.2 OROPHARYNGEAL LESION: Status: ACTIVE | Noted: 2024-10-05

## 2024-10-09 PROCEDURE — 42800 BIOPSY OF THROAT: CPT

## 2024-10-09 PROCEDURE — 99213 OFFICE O/P EST LOW 20 MIN: CPT | Mod: 25

## 2024-10-09 RX ORDER — AZITHROMYCIN 250 MG/1
250 TABLET, FILM COATED ORAL
Qty: 1 | Refills: 0 | Status: ACTIVE | COMMUNITY
Start: 2024-10-09 | End: 1900-01-01

## 2024-10-15 ENCOUNTER — NON-APPOINTMENT (OUTPATIENT)
Age: 74
End: 2024-10-15

## 2024-10-17 ENCOUNTER — APPOINTMENT (OUTPATIENT)
Dept: OTOLARYNGOLOGY | Facility: CLINIC | Age: 74
End: 2024-10-17
Payer: MEDICARE

## 2024-10-17 VITALS
WEIGHT: 130 LBS | OXYGEN SATURATION: 96 % | SYSTOLIC BLOOD PRESSURE: 124 MMHG | TEMPERATURE: 97.8 F | HEART RATE: 74 BPM | BODY MASS INDEX: 21.66 KG/M2 | DIASTOLIC BLOOD PRESSURE: 84 MMHG | HEIGHT: 65 IN

## 2024-10-17 DIAGNOSIS — K21.9 ACUTE LARYNGITIS: ICD-10-CM

## 2024-10-17 DIAGNOSIS — J39.2 OTHER DISEASES OF PHARYNX: ICD-10-CM

## 2024-10-17 DIAGNOSIS — J04.0 ACUTE LARYNGITIS: ICD-10-CM

## 2024-10-17 PROCEDURE — 99213 OFFICE O/P EST LOW 20 MIN: CPT | Mod: 24

## 2024-10-31 ENCOUNTER — APPOINTMENT (OUTPATIENT)
Dept: OTOLARYNGOLOGY | Facility: CLINIC | Age: 74
End: 2024-10-31

## 2024-12-19 ENCOUNTER — APPOINTMENT (OUTPATIENT)
Dept: OTOLARYNGOLOGY | Facility: CLINIC | Age: 74
End: 2024-12-19
Payer: MEDICARE

## 2024-12-19 VITALS
HEIGHT: 65 IN | WEIGHT: 130 LBS | SYSTOLIC BLOOD PRESSURE: 139 MMHG | BODY MASS INDEX: 21.66 KG/M2 | TEMPERATURE: 97.3 F | DIASTOLIC BLOOD PRESSURE: 90 MMHG | OXYGEN SATURATION: 100 % | HEART RATE: 89 BPM

## 2024-12-19 DIAGNOSIS — J39.2 OTHER DISEASES OF PHARYNX: ICD-10-CM

## 2024-12-19 DIAGNOSIS — K21.9 ACUTE LARYNGITIS: ICD-10-CM

## 2024-12-19 DIAGNOSIS — J04.0 ACUTE LARYNGITIS: ICD-10-CM

## 2024-12-19 PROCEDURE — 31575 DIAGNOSTIC LARYNGOSCOPY: CPT

## 2024-12-19 PROCEDURE — 99213 OFFICE O/P EST LOW 20 MIN: CPT | Mod: 25

## 2024-12-19 RX ORDER — FAMOTIDINE 40 MG/1
40 TABLET, FILM COATED ORAL
Qty: 90 | Refills: 1 | Status: ACTIVE | COMMUNITY
Start: 2024-12-19 | End: 1900-01-01

## 2025-01-16 ENCOUNTER — TRANSCRIPTION ENCOUNTER (OUTPATIENT)
Age: 75
End: 2025-01-16

## 2025-02-05 ENCOUNTER — TRANSCRIPTION ENCOUNTER (OUTPATIENT)
Age: 75
End: 2025-02-05

## 2025-02-06 ENCOUNTER — NON-APPOINTMENT (OUTPATIENT)
Age: 75
End: 2025-02-06

## 2025-02-06 ENCOUNTER — APPOINTMENT (OUTPATIENT)
Dept: HEART AND VASCULAR | Facility: CLINIC | Age: 75
End: 2025-02-06
Payer: MEDICARE

## 2025-02-06 VITALS
DIASTOLIC BLOOD PRESSURE: 73 MMHG | OXYGEN SATURATION: 95 % | BODY MASS INDEX: 21.99 KG/M2 | SYSTOLIC BLOOD PRESSURE: 117 MMHG | HEIGHT: 65 IN | TEMPERATURE: 97.8 F | HEART RATE: 94 BPM | WEIGHT: 132 LBS

## 2025-02-06 DIAGNOSIS — E78.5 HYPERLIPIDEMIA, UNSPECIFIED: ICD-10-CM

## 2025-02-06 PROCEDURE — 99214 OFFICE O/P EST MOD 30 MIN: CPT

## 2025-02-06 PROCEDURE — 36415 COLL VENOUS BLD VENIPUNCTURE: CPT

## 2025-02-06 PROCEDURE — 93000 ELECTROCARDIOGRAM COMPLETE: CPT

## 2025-02-07 ENCOUNTER — NON-APPOINTMENT (OUTPATIENT)
Age: 75
End: 2025-02-07

## 2025-02-07 LAB
CHOLEST SERPL-MCNC: 185 MG/DL
HDLC SERPL-MCNC: 77 MG/DL
LDLC SERPL CALC-MCNC: 89 MG/DL
NONHDLC SERPL-MCNC: 108 MG/DL
TRIGL SERPL-MCNC: 110 MG/DL

## 2025-02-13 ENCOUNTER — APPOINTMENT (OUTPATIENT)
Dept: PULMONOLOGY | Facility: CLINIC | Age: 75
End: 2025-02-13
Payer: MEDICARE

## 2025-02-13 VITALS
SYSTOLIC BLOOD PRESSURE: 140 MMHG | OXYGEN SATURATION: 98 % | BODY MASS INDEX: 21.99 KG/M2 | WEIGHT: 132 LBS | HEART RATE: 87 BPM | DIASTOLIC BLOOD PRESSURE: 80 MMHG | HEIGHT: 65 IN | TEMPERATURE: 97.6 F

## 2025-02-13 DIAGNOSIS — R91.1 SOLITARY PULMONARY NODULE: ICD-10-CM

## 2025-02-13 PROCEDURE — 99213 OFFICE O/P EST LOW 20 MIN: CPT

## 2025-02-13 PROCEDURE — G2211 COMPLEX E/M VISIT ADD ON: CPT

## 2025-03-04 ENCOUNTER — APPOINTMENT (OUTPATIENT)
Dept: GASTROENTEROLOGY | Facility: CLINIC | Age: 75
End: 2025-03-04
Payer: MEDICARE

## 2025-03-04 VITALS
DIASTOLIC BLOOD PRESSURE: 80 MMHG | HEART RATE: 79 BPM | OXYGEN SATURATION: 98 % | SYSTOLIC BLOOD PRESSURE: 130 MMHG | TEMPERATURE: 97.3 F | WEIGHT: 130 LBS | BODY MASS INDEX: 21.66 KG/M2 | HEIGHT: 65 IN

## 2025-03-04 DIAGNOSIS — K20.90 ESOPHAGITIS, UNSPECIFIED WITHOUT BLEEDING: ICD-10-CM

## 2025-03-04 PROCEDURE — 99203 OFFICE O/P NEW LOW 30 MIN: CPT

## 2025-03-04 RX ORDER — OMEPRAZOLE 20 MG/1
20 CAPSULE, DELAYED RELEASE ORAL
Qty: 60 | Refills: 0 | Status: ACTIVE | COMMUNITY
Start: 2025-03-04 | End: 1900-01-01

## 2025-04-09 ENCOUNTER — TRANSCRIPTION ENCOUNTER (OUTPATIENT)
Age: 75
End: 2025-04-09

## 2025-04-10 ENCOUNTER — APPOINTMENT (OUTPATIENT)
Dept: OTOLARYNGOLOGY | Facility: CLINIC | Age: 75
End: 2025-04-10

## 2025-05-05 ENCOUNTER — TRANSCRIPTION ENCOUNTER (OUTPATIENT)
Age: 75
End: 2025-05-05

## 2025-07-24 ENCOUNTER — TRANSCRIPTION ENCOUNTER (OUTPATIENT)
Age: 75
End: 2025-07-24

## 2025-08-11 ENCOUNTER — APPOINTMENT (OUTPATIENT)
Dept: OTOLARYNGOLOGY | Facility: CLINIC | Age: 75
End: 2025-08-11
Payer: MEDICARE

## 2025-08-11 VITALS
BODY MASS INDEX: 21.66 KG/M2 | WEIGHT: 130 LBS | HEIGHT: 65 IN | TEMPERATURE: 98.2 F | DIASTOLIC BLOOD PRESSURE: 83 MMHG | SYSTOLIC BLOOD PRESSURE: 138 MMHG | HEART RATE: 86 BPM | OXYGEN SATURATION: 98 %

## 2025-08-11 DIAGNOSIS — R13.14 DYSPHAGIA, PHARYNGOESOPHAGEAL PHASE: ICD-10-CM

## 2025-08-11 DIAGNOSIS — J03.90 ACUTE TONSILLITIS, UNSPECIFIED: ICD-10-CM

## 2025-08-11 DIAGNOSIS — J04.0 ACUTE LARYNGITIS: ICD-10-CM

## 2025-08-11 DIAGNOSIS — K21.9 ACUTE LARYNGITIS: ICD-10-CM

## 2025-08-11 PROCEDURE — 99213 OFFICE O/P EST LOW 20 MIN: CPT | Mod: 25

## 2025-08-11 PROCEDURE — 31575 DIAGNOSTIC LARYNGOSCOPY: CPT

## 2025-08-11 RX ORDER — AZITHROMYCIN 250 MG/1
250 TABLET, FILM COATED ORAL
Qty: 1 | Refills: 0 | Status: ACTIVE | COMMUNITY
Start: 2025-08-11 | End: 1900-01-01

## 2025-09-18 ENCOUNTER — TRANSCRIPTION ENCOUNTER (OUTPATIENT)
Age: 75
End: 2025-09-18